# Patient Record
Sex: FEMALE | Race: WHITE | NOT HISPANIC OR LATINO | Employment: UNEMPLOYED | URBAN - METROPOLITAN AREA
[De-identification: names, ages, dates, MRNs, and addresses within clinical notes are randomized per-mention and may not be internally consistent; named-entity substitution may affect disease eponyms.]

---

## 2017-07-02 ENCOUNTER — HOSPITAL ENCOUNTER (EMERGENCY)
Facility: HOSPITAL | Age: 32
Discharge: HOME | End: 2017-07-03
Attending: EMERGENCY MEDICINE
Payer: MEDICAID

## 2017-07-02 DIAGNOSIS — S00.551A: Primary | ICD-10-CM

## 2017-07-02 PROCEDURE — 96372 THER/PROPH/DIAG INJ SC/IM: CPT

## 2017-07-02 PROCEDURE — 99283 EMERGENCY DEPT VISIT LOW MDM: CPT | Mod: 25

## 2017-07-02 RX ORDER — KETOROLAC TROMETHAMINE 30 MG/ML
30 INJECTION, SOLUTION INTRAMUSCULAR; INTRAVENOUS
Status: COMPLETED | OUTPATIENT
Start: 2017-07-03 | End: 2017-07-03

## 2017-07-03 VITALS
TEMPERATURE: 96 F | RESPIRATION RATE: 20 BRPM | BODY MASS INDEX: 30.21 KG/M2 | SYSTOLIC BLOOD PRESSURE: 97 MMHG | DIASTOLIC BLOOD PRESSURE: 65 MMHG | WEIGHT: 160 LBS | OXYGEN SATURATION: 99 % | HEIGHT: 61 IN | HEART RATE: 57 BPM

## 2017-07-03 PROBLEM — S00.551A: Status: ACTIVE | Noted: 2017-07-03

## 2017-07-03 PROCEDURE — 63600175 PHARM REV CODE 636 W HCPCS: Performed by: EMERGENCY MEDICINE

## 2017-07-03 RX ORDER — CLONIDINE HYDROCHLORIDE 0.1 MG/1
0.1 TABLET ORAL 2 TIMES DAILY
COMMUNITY
End: 2021-09-04 | Stop reason: ALTCHOICE

## 2017-07-03 RX ORDER — MIRTAZAPINE 15 MG/1
15 TABLET, ORALLY DISINTEGRATING ORAL NIGHTLY
COMMUNITY
End: 2021-09-04 | Stop reason: ALTCHOICE

## 2017-07-03 RX ADMIN — KETOROLAC TROMETHAMINE 30 MG: 30 INJECTION, SOLUTION INTRAMUSCULAR at 12:07

## 2017-07-03 NOTE — DISCHARGE INSTRUCTIONS
Follow up with PCP/ENT for reevaluation of lip piercing.    Return to ER if symptoms persist or worsen.    I discussed wound care precautions; specifically, that all wounds have risk of infection despite efforts to cleanse and debride the wound; and there is a risk of an occult foreign body (and thus increased risk of infection) despite a negative examination.  I discussed with patient need to return for any signs of infection, specifically redness, increased pain, fever, drainage of pus, or any concern, immediately.

## 2017-07-03 NOTE — ED PROVIDER NOTES
"SCRIBE #1 NOTE: I, Cody Vikas, am scribing for, and in the presence of, Layton Dietz Jr., MD. I have scribed the entire note.      History      Chief Complaint   Patient presents with    Other     pt has chinedu guajardo to left check .reports having it lodged into "  her face" reports urgent care couldnt remove it and she wants it removed.        Review of patient's allergies indicates:  No Known Allergies     HPI   HPI    7/2/2017, 11:05 PM   History obtained from the patient      History of Present Illness: Tressa Castaneda is a 32 y.o. female patient who presents to the Emergency Department for the removal of a piercing which onset suddenly today. Pt states she was placing a facial piercing, but the piecing has become lodged into her face. Pt states he went to the urgent care to have the piercing removed, but reports she was advised to arianna here to have the piercing removed.Symptoms are constant and moderate in severity. Exacerbated by nothing and relieved by nothing. Patient denies any fever, chills, HA, weakness, N/V/D, abdominal pain, swelling, increased warmth, and all other sxs at this time. No further complaints or concerns at this time.     Arrival mode: Personal vehicle    PCP: Primary Doctor No     Past Medical History:  Past medical history reviewed not relevant      Past Surgical History:  Past surgical history reviewed not relevant      Family History:  Family history reviewed not relevant      Social History:  Social History    Social History Main Topics    Social History Main Topics    Smoking status: Unknown if ever smoked    Smokeless tobacco: Unknown if ever used    Alcohol Use: Unknown drinking history    Drug Use: Unknown if ever used    Sexual Activity: Unknown         ROS   Review of Systems   Constitutional: Negative for chills, diaphoresis and fever.        (+) Foreign body in left cheek   HENT: Negative for sore throat.    Respiratory: Negative for shortness of breath.  " "  Cardiovascular: Negative for chest pain.   Gastrointestinal: Negative for abdominal pain, diarrhea, nausea and vomiting.   Genitourinary: Negative for dysuria.   Musculoskeletal: Negative for back pain, neck pain and neck stiffness.   Skin: Negative for rash and wound.   Neurological: Negative for dizziness, weakness, light-headedness and headaches.   Hematological: Does not bruise/bleed easily.     Physical Exam      Initial Vitals [07/02/17 2237]   BP Pulse Resp Temp SpO2   (!) 170/74 61 19 98.2 °F (36.8 °C) 98 %      MAP       106          Physical Exam  Nursing Notes and Vital Signs Reviewed.  Constitutional: Patient is in no acute distress. Well-developed and well-nourished.  Head: Atraumatic. Normocephalic.  Eyes: PERRL. EOM intact. Conjunctivae are not pale. No scleral icterus.  ENT: Mucous membranes are moist. Oropharynx is clear and symmetric.    Neck: Supple. Full ROM. No lymphadenopathy.  Cardiovascular: Regular rate. Regular rhythm.  Pulmonary/Chest: No respiratory distress.  Abdominal: Soft and non-distended.   Musculoskeletal: Moves all extremities. No obvious deformities.  Skin: Warm and dry. No palpable foreign body on exam.  Neurological:  Alert, awake, and appropriate.  Normal speech.  No acute focal neurological deficits are appreciated.  Psychiatric: Normal affect. Good eye contact. Appropriate in content.    ED Course    Procedures  ED Vital Signs:  Vitals:    07/02/17 2237 07/03/17 0100   BP: (!) 170/74 97/65   Pulse: 61 (!) 57   Resp: 19 20   Temp: 98.2 °F (36.8 °C) 96.2 °F (35.7 °C)   TempSrc: Oral Oral   SpO2: 98% 99%   Weight: 72.6 kg (160 lb)    Height: 5' 1" (1.549 m)             Imaging Results          X-Ray Mandible More Than 4 Views (Final result)  Result time 07/03/17 08:18:51    Final result by Matthew Bueno MD (07/03/17 08:18:51)                 Impression:      No acute fracture or dislocation.  No definite radiopaque foreign body is unknown.       Electronically signed " by: OSMEL SHIPMAN MD  Date:     07/03/17  Time:    08:18              Narrative:    History:  Lost posterior element of upper lip ring    Comparison:  None    Results:  4 views of the mandible were obtained.    No evidence of acute fracture or dislocation.  Bony mineralization is normal.  Soft tissues are unremarkable.  No definite radiopaque foreign body is unknown. Radiopacity is seen on the RIR view however is not seen on any other views. This is thought to reflect a tooth seen in face.                                The Emergency Provider reviewed the vital signs and test results, which are outlined above.    ED Discussion     1:14 AM: No foreign body appreciated via x-ray. Discussed with pt imaging results as well as advised her to take prescribed abx from Urgent care and to f/u with PCP or ENT if issue persists.     1:25 AM: Reassessed pt at this time. Pt is awake, alert, and in NAD. Pt states her condition has improved at this time. Discussed with pt all pertinent ED information and results. Discussed pt dx of foreign body in skin of lip and plan of tx. Gave pt all f/u and return to the ED instructions. All questions and concerns were addressed at this time. Pt expresses understanding of information and instructions, and is comfortable with plan to discharge. Pt is stable for discharge.    I discussed with patient and/or family/caretaker that evaluation in the ED does not suggest any emergent or life threatening medical conditions requiring immediate intervention beyond what was provided in the ED, and I believe patient is safe for discharge.  Regardless, an unremarkable evaluation in the ED does not preclude the development or presence of a serious of life threatening condition. As such, patient was instructed to return immediately for any worsening or change in current symptoms.    I discussed wound care precautions; specifically, that all wounds have risk of infection despite efforts to cleanse and debride  the wound; and there is a risk of an occult foreign body (and thus increased risk of infection) despite a negative examination.  I discussed with patient need to return for any signs of infection, specifically redness, increased pain, fever, drainage of pus, or any concern, immediately.        ED Medication(s):  Medications   ketorolac injection 30 mg (30 mg Intramuscular Given 7/3/17 0024)       Discharge Medication List as of 7/3/2017  1:28 AM          Follow-up Information     [facility]. Schedule an appointment as soon as possible for a visit in 1 week.    Specialty:  Internal Medicine  Contact information:  [address] [facility] [id number] [phone]  Additional information:  (off [employer]) 1st floor           [facility]. Schedule an appointment as soon as possible for a visit in 1 week.    Specialty:  Otolaryngology  Contact information:  [address] [facility] [id number] [phone]  Additional information:  (off [employer]) 4th floor           [facility] - .    Specialty:  Emergency Medicine  Why:  If symptoms worsen, As needed  Contact information:  [address]  [facility] [id number] [phone]                   Medical Decision Making    Medical Decision Making:   Clinical Tests:   Radiological Study: Ordered and Reviewed           Scribe Attestation:   Scribe #1: I performed the above scribed service and the documentation accurately describes the services I performed. I attest to the accuracy of the note.    Attending:   Physician Attestation Statement for Scribe #1: I, Layton Dietz Jr., MD, personally performed the services described in this documentation, as scribed by Cody Bean, in my presence, and it is both accurate and complete.          Clinical Impression       ICD-10-CM ICD-9-CM   1. Foreign body in skin of lip  910.6       Disposition:   Disposition: Discharged  Condition: Stable          Layton Dietz Jr., MD  07/14/17 0114

## 2022-01-24 PROBLEM — Z85.3 HISTORY OF BREAST CANCER: Status: ACTIVE | Noted: 2022-01-24

## 2022-01-24 PROBLEM — Z87.898 HISTORY OF SUBSTANCE USE: Status: ACTIVE | Noted: 2022-01-24

## 2022-01-24 PROBLEM — N64.52 NIPPLE DISCHARGE: Status: ACTIVE | Noted: 2022-01-24

## 2022-03-04 PROBLEM — F11.11 HISTORY OF HEROIN ABUSE: Status: ACTIVE | Noted: 2022-03-04

## 2022-03-04 PROBLEM — Z80.3 FAMILY HISTORY OF BREAST CANCER: Status: ACTIVE | Noted: 2022-03-04

## 2022-03-04 PROBLEM — Z98.890 PERSONAL HISTORY OF BENIGN BREAST BIOPSY: Status: ACTIVE | Noted: 2022-01-24

## 2022-04-21 PROBLEM — E66.9 OBESITY (BMI 30-39.9): Status: ACTIVE | Noted: 2022-04-21

## 2022-04-21 PROBLEM — N64.52 NIPPLE DISCHARGE: Chronic | Status: ACTIVE | Noted: 2022-01-24

## 2022-05-03 PROBLEM — N64.52 NIPPLE DISCHARGE: Chronic | Status: RESOLVED | Noted: 2022-01-24 | Resolved: 2022-05-03
